# Patient Record
Sex: MALE | Race: WHITE | NOT HISPANIC OR LATINO | Employment: FULL TIME | URBAN - METROPOLITAN AREA
[De-identification: names, ages, dates, MRNs, and addresses within clinical notes are randomized per-mention and may not be internally consistent; named-entity substitution may affect disease eponyms.]

---

## 2023-03-04 ENCOUNTER — APPOINTMENT (OUTPATIENT)
Dept: RADIOLOGY | Facility: MEDICAL CENTER | Age: 68
DRG: 065 | End: 2023-03-04
Attending: EMERGENCY MEDICINE
Payer: MEDICARE

## 2023-03-04 ENCOUNTER — HOSPITAL ENCOUNTER (INPATIENT)
Facility: MEDICAL CENTER | Age: 68
LOS: 1 days | DRG: 065 | End: 2023-03-05
Attending: EMERGENCY MEDICINE | Admitting: INTERNAL MEDICINE
Payer: MEDICARE

## 2023-03-04 DIAGNOSIS — I63.9 STROKE WITH CEREBRAL ISCHEMIA (HCC): ICD-10-CM

## 2023-03-04 DIAGNOSIS — R53.1 ACUTE RIGHT-SIDED WEAKNESS: ICD-10-CM

## 2023-03-04 PROBLEM — I10 PRIMARY HYPERTENSION: Status: ACTIVE | Noted: 2023-03-04

## 2023-03-04 PROBLEM — C61 PROSTATE CANCER (HCC): Status: ACTIVE | Noted: 2023-03-04

## 2023-03-04 LAB
ABO + RH BLD: NORMAL
ABO GROUP BLD: NORMAL
ALBUMIN SERPL BCP-MCNC: 4.3 G/DL (ref 3.2–4.9)
ALBUMIN/GLOB SERPL: 1.5 G/DL
ALP SERPL-CCNC: 79 U/L (ref 30–99)
ALT SERPL-CCNC: 49 U/L (ref 2–50)
ANION GAP SERPL CALC-SCNC: 12 MMOL/L (ref 7–16)
APTT PPP: 24.6 SEC (ref 24.7–36)
AST SERPL-CCNC: 27 U/L (ref 12–45)
BASOPHILS # BLD AUTO: 0.7 % (ref 0–1.8)
BASOPHILS # BLD: 0.03 K/UL (ref 0–0.12)
BILIRUB SERPL-MCNC: 0.3 MG/DL (ref 0.1–1.5)
BLD GP AB SCN SERPL QL: NORMAL
BUN SERPL-MCNC: 18 MG/DL (ref 8–22)
CALCIUM ALBUM COR SERPL-MCNC: 9 MG/DL (ref 8.5–10.5)
CALCIUM SERPL-MCNC: 9.2 MG/DL (ref 8.5–10.5)
CHLORIDE SERPL-SCNC: 102 MMOL/L (ref 96–112)
CO2 SERPL-SCNC: 23 MMOL/L (ref 20–33)
CREAT SERPL-MCNC: 0.87 MG/DL (ref 0.5–1.4)
EKG IMPRESSION: NORMAL
EOSINOPHIL # BLD AUTO: 0.06 K/UL (ref 0–0.51)
EOSINOPHIL NFR BLD: 1.3 % (ref 0–6.9)
ERYTHROCYTE [DISTWIDTH] IN BLOOD BY AUTOMATED COUNT: 38.5 FL (ref 35.9–50)
EST. AVERAGE GLUCOSE BLD GHB EST-MCNC: 117 MG/DL
GFR SERPLBLD CREATININE-BSD FMLA CKD-EPI: 95 ML/MIN/1.73 M 2
GLOBULIN SER CALC-MCNC: 2.8 G/DL (ref 1.9–3.5)
GLUCOSE SERPL-MCNC: 115 MG/DL (ref 65–99)
HBA1C MFR BLD: 5.7 % (ref 4–5.6)
HCT VFR BLD AUTO: 46.1 % (ref 42–52)
HGB BLD-MCNC: 15.6 G/DL (ref 14–18)
IMM GRANULOCYTES # BLD AUTO: 0.04 K/UL (ref 0–0.11)
IMM GRANULOCYTES NFR BLD AUTO: 0.9 % (ref 0–0.9)
INR PPP: 0.92 (ref 0.87–1.13)
LYMPHOCYTES # BLD AUTO: 1.42 K/UL (ref 1–4.8)
LYMPHOCYTES NFR BLD: 31.3 % (ref 22–41)
MCH RBC QN AUTO: 28.8 PG (ref 27–33)
MCHC RBC AUTO-ENTMCNC: 33.8 G/DL (ref 33.7–35.3)
MCV RBC AUTO: 85.1 FL (ref 81.4–97.8)
MONOCYTES # BLD AUTO: 0.34 K/UL (ref 0–0.85)
MONOCYTES NFR BLD AUTO: 7.5 % (ref 0–13.4)
NEUTROPHILS # BLD AUTO: 2.65 K/UL (ref 1.82–7.42)
NEUTROPHILS NFR BLD: 58.3 % (ref 44–72)
NRBC # BLD AUTO: 0 K/UL
NRBC BLD-RTO: 0 /100 WBC
PLATELET # BLD AUTO: 201 K/UL (ref 164–446)
PMV BLD AUTO: 11.1 FL (ref 9–12.9)
POTASSIUM SERPL-SCNC: 4.3 MMOL/L (ref 3.6–5.5)
PROT SERPL-MCNC: 7.1 G/DL (ref 6–8.2)
PROTHROMBIN TIME: 12.3 SEC (ref 12–14.6)
RBC # BLD AUTO: 5.42 M/UL (ref 4.7–6.1)
RH BLD: NORMAL
SODIUM SERPL-SCNC: 137 MMOL/L (ref 135–145)
TROPONIN T SERPL-MCNC: 7 NG/L (ref 6–19)
WBC # BLD AUTO: 4.5 K/UL (ref 4.8–10.8)

## 2023-03-04 PROCEDURE — A9270 NON-COVERED ITEM OR SERVICE: HCPCS | Performed by: PSYCHIATRY & NEUROLOGY

## 2023-03-04 PROCEDURE — 92610 EVALUATE SWALLOWING FUNCTION: CPT

## 2023-03-04 PROCEDURE — 99222 1ST HOSP IP/OBS MODERATE 55: CPT | Performed by: PSYCHIATRY & NEUROLOGY

## 2023-03-04 PROCEDURE — 84484 ASSAY OF TROPONIN QUANT: CPT

## 2023-03-04 PROCEDURE — 770020 HCHG ROOM/CARE - TELE (206)

## 2023-03-04 PROCEDURE — 0042T CT-CEREBRAL PERFUSION ANALYSIS: CPT

## 2023-03-04 PROCEDURE — 36415 COLL VENOUS BLD VENIPUNCTURE: CPT

## 2023-03-04 PROCEDURE — 86901 BLOOD TYPING SEROLOGIC RH(D): CPT

## 2023-03-04 PROCEDURE — 85730 THROMBOPLASTIN TIME PARTIAL: CPT

## 2023-03-04 PROCEDURE — 86900 BLOOD TYPING SEROLOGIC ABO: CPT

## 2023-03-04 PROCEDURE — 83036 HEMOGLOBIN GLYCOSYLATED A1C: CPT

## 2023-03-04 PROCEDURE — 86850 RBC ANTIBODY SCREEN: CPT

## 2023-03-04 PROCEDURE — 99223 1ST HOSP IP/OBS HIGH 75: CPT | Mod: AI | Performed by: INTERNAL MEDICINE

## 2023-03-04 PROCEDURE — 700102 HCHG RX REV CODE 250 W/ 637 OVERRIDE(OP): Performed by: INTERNAL MEDICINE

## 2023-03-04 PROCEDURE — 71045 X-RAY EXAM CHEST 1 VIEW: CPT

## 2023-03-04 PROCEDURE — 85610 PROTHROMBIN TIME: CPT

## 2023-03-04 PROCEDURE — 99285 EMERGENCY DEPT VISIT HI MDM: CPT

## 2023-03-04 PROCEDURE — A9270 NON-COVERED ITEM OR SERVICE: HCPCS | Performed by: INTERNAL MEDICINE

## 2023-03-04 PROCEDURE — 85025 COMPLETE CBC W/AUTO DIFF WBC: CPT

## 2023-03-04 PROCEDURE — 70496 CT ANGIOGRAPHY HEAD: CPT

## 2023-03-04 PROCEDURE — 700117 HCHG RX CONTRAST REV CODE 255: Performed by: EMERGENCY MEDICINE

## 2023-03-04 PROCEDURE — 700102 HCHG RX REV CODE 250 W/ 637 OVERRIDE(OP): Performed by: PSYCHIATRY & NEUROLOGY

## 2023-03-04 PROCEDURE — 70450 CT HEAD/BRAIN W/O DYE: CPT

## 2023-03-04 PROCEDURE — 93005 ELECTROCARDIOGRAM TRACING: CPT | Performed by: EMERGENCY MEDICINE

## 2023-03-04 PROCEDURE — 700111 HCHG RX REV CODE 636 W/ 250 OVERRIDE (IP): Performed by: INTERNAL MEDICINE

## 2023-03-04 PROCEDURE — 94760 N-INVAS EAR/PLS OXIMETRY 1: CPT

## 2023-03-04 PROCEDURE — 70498 CT ANGIOGRAPHY NECK: CPT

## 2023-03-04 PROCEDURE — 80053 COMPREHEN METABOLIC PANEL: CPT

## 2023-03-04 RX ORDER — MONTELUKAST SODIUM 4 MG/1
1 TABLET, CHEWABLE ORAL 2 TIMES DAILY
COMMUNITY

## 2023-03-04 RX ORDER — ENOXAPARIN SODIUM 100 MG/ML
40 INJECTION SUBCUTANEOUS DAILY
Status: DISCONTINUED | OUTPATIENT
Start: 2023-03-04 | End: 2023-03-05 | Stop reason: HOSPADM

## 2023-03-04 RX ORDER — ASPIRIN 81 MG/1
162 TABLET, CHEWABLE ORAL DAILY
Status: DISCONTINUED | OUTPATIENT
Start: 2023-03-04 | End: 2023-03-04

## 2023-03-04 RX ORDER — BISACODYL 10 MG
10 SUPPOSITORY, RECTAL RECTAL
Status: DISCONTINUED | OUTPATIENT
Start: 2023-03-04 | End: 2023-03-05 | Stop reason: HOSPADM

## 2023-03-04 RX ORDER — OMEPRAZOLE 20 MG/1
20 CAPSULE, DELAYED RELEASE ORAL DAILY
COMMUNITY

## 2023-03-04 RX ORDER — ATORVASTATIN CALCIUM 80 MG/1
80 TABLET, FILM COATED ORAL EVERY EVENING
Status: DISCONTINUED | OUTPATIENT
Start: 2023-03-04 | End: 2023-03-05 | Stop reason: HOSPADM

## 2023-03-04 RX ORDER — POLYETHYLENE GLYCOL 3350 17 G/17G
1 POWDER, FOR SOLUTION ORAL
Status: DISCONTINUED | OUTPATIENT
Start: 2023-03-04 | End: 2023-03-05 | Stop reason: HOSPADM

## 2023-03-04 RX ORDER — ASPIRIN 81 MG/1
324 TABLET, CHEWABLE ORAL DAILY
Status: DISCONTINUED | OUTPATIENT
Start: 2023-03-04 | End: 2023-03-04

## 2023-03-04 RX ORDER — AMOXICILLIN 250 MG
2 CAPSULE ORAL 2 TIMES DAILY
Status: DISCONTINUED | OUTPATIENT
Start: 2023-03-04 | End: 2023-03-05 | Stop reason: HOSPADM

## 2023-03-04 RX ORDER — SILDENAFIL CITRATE 20 MG/1
20 TABLET ORAL DAILY
COMMUNITY

## 2023-03-04 RX ORDER — ASPIRIN 300 MG/1
300 SUPPOSITORY RECTAL DAILY
Status: DISCONTINUED | OUTPATIENT
Start: 2023-03-04 | End: 2023-03-04

## 2023-03-04 RX ORDER — ASPIRIN 325 MG
325 TABLET ORAL DAILY
Status: DISCONTINUED | OUTPATIENT
Start: 2023-03-04 | End: 2023-03-04

## 2023-03-04 RX ADMIN — ENOXAPARIN SODIUM 40 MG: 100 INJECTION SUBCUTANEOUS at 17:23

## 2023-03-04 RX ADMIN — IOHEXOL 40 ML: 350 INJECTION, SOLUTION INTRAVENOUS at 10:52

## 2023-03-04 RX ADMIN — ASPIRIN 81 MG 162 MG: 81 TABLET ORAL at 11:19

## 2023-03-04 RX ADMIN — IOHEXOL 80 ML: 350 INJECTION, SOLUTION INTRAVENOUS at 10:50

## 2023-03-04 RX ADMIN — ATORVASTATIN CALCIUM 80 MG: 80 TABLET, FILM COATED ORAL at 17:24

## 2023-03-04 ASSESSMENT — ENCOUNTER SYMPTOMS
CHILLS: 0
DIARRHEA: 0
ORTHOPNEA: 0
DIZZINESS: 0
FOCAL WEAKNESS: 1
WEIGHT LOSS: 0
ABDOMINAL PAIN: 0
VOMITING: 0
DOUBLE VISION: 0
SPEECH CHANGE: 0
CLAUDICATION: 0
PALPITATIONS: 0
FEVER: 0
WEAKNESS: 0
MYALGIAS: 0
CONSTIPATION: 0
TINGLING: 1
NAUSEA: 0
HEMOPTYSIS: 0
BLURRED VISION: 0
NECK PAIN: 0
PHOTOPHOBIA: 0
SENSORY CHANGE: 1
COUGH: 0

## 2023-03-04 ASSESSMENT — COGNITIVE AND FUNCTIONAL STATUS - GENERAL
DAILY ACTIVITIY SCORE: 24
MOVING FROM LYING ON BACK TO SITTING ON SIDE OF FLAT BED: A LITTLE
SUGGESTED CMS G CODE MODIFIER DAILY ACTIVITY: CH
WALKING IN HOSPITAL ROOM: A LITTLE
CLIMB 3 TO 5 STEPS WITH RAILING: A LITTLE
SUGGESTED CMS G CODE MODIFIER MOBILITY: CJ
MOBILITY SCORE: 21

## 2023-03-04 ASSESSMENT — PAIN DESCRIPTION - PAIN TYPE: TYPE: ACUTE PAIN

## 2023-03-04 NOTE — ED NOTES
68 y/o male notice weakness and numbness to right arm and leg, onset 0900. Pt NIH 2 decreased sensation to right and slight drift. Describes as not eeling the same. A&o x 4 no facial droop or slurred speech noted. Fsbs 118  Arrival time 1030

## 2023-03-04 NOTE — ED PROVIDER NOTES
"ED Provider Note    CHIEF COMPLAINT  Chief Complaint   Patient presents with    Numbness     Right sided onset 0900       EXTERNAL RECORDS REVIEWED  Other none available    HPI/ROS  LIMITATION TO HISTORY   Select: : None  OUTSIDE HISTORIAN(S):  Significant other pt's wife states pt    Ross Brown is a 67 y.o. male with history of hypertension who presents for strokelike symptoms.    Per EMS, the patient was reaching up to get an object out of a cabinet when he had acute onset of right arm weakness/numbness at 8:50 AM.    Patient's wife reports that the patient felt numbness on the right side of his tongue, the right side of his face and in his extremities.    Patient denies headache, chest pain, shortness of breath, history of similar symptoms.    PAST MEDICAL HISTORY   has a past medical history of Hypertension.  Prostate cancer    SURGICAL HISTORY  Prostatectomy  Cholecystectomy    FAMILY HISTORY  History reviewed. No pertinent family history.    SOCIAL HISTORY  Social History     Tobacco Use    Smoking status: Not on file    Smokeless tobacco: Not on file   Substance and Sexual Activity    Alcohol use: Not on file    Drug use: Not on file    Sexual activity: Not on file       CURRENT MEDICATIONS  Home Medications       Reviewed by Stacia Valencia (Pharmacy Tech) on 03/04/23 at 1230  Med List Status: Complete     Medication Last Dose Status   colestipol (COLESTID) 1 GM Tab 3/3/2023 Active   omeprazole (PRILOSEC) 20 MG delayed-release capsule 3/4/2023 Active   sildenafil (REVATIO) 20 MG tablet \"FEW DAYS AGO\" Active                    ALLERGIES  Allergies   Allergen Reactions    Penicillins Swelling     Swelling 25 years ago (1998)       PHYSICAL EXAM  VITAL SIGNS: /72   Pulse 74   Temp 37.2 °C (99 °F) (Temporal)   Resp 14   Ht 1.727 m (5' 8\")   Wt 79.4 kg (175 lb)   SpO2 93%   BMI 26.61 kg/m²    General:  WDWN male, nontoxic appearing in NAD; A+Ox3; V/S as above; elevated blood pressure  Skin: warm " and dry; good color; no rash  HEENT: NCAT; EOMs intact; PERRL; no scleral icterus   Neck: FROM, no JVD  Cardiovascular: Regular heart rate and rhythm.  No murmurs, rubs, or gallops; pulses 2+ bilaterally radially  Lungs: No respiratory distress or tachypnea; Clear to auscultation with good air movement bilaterally.  No wheezes, rhonchi, or rales.   Abdomen: BS present; soft; NTND; no rebound, guarding, or rigidity.  No organomegaly or pulsatile mass  Extremities: VERA x 4; no e/o trauma; no pedal edema; neg Kiran's  Neurologic: CNs III-XII formally intact; no facial droop, speech clear; distal sensation intact but reduced on the right;  strength 5 out of 5 bilaterally; slight drift on the right upper extremity  Psychiatric: Appropriate affect, normal mood      DIAGNOSTIC STUDIES / PROCEDURES  EKG  I have independently interpreted this EKG. no ST elevation.  No A-fib.    LABS  Results for orders placed or performed during the hospital encounter of 03/04/23   CBC WITH DIFFERENTIAL   Result Value Ref Range    WBC 4.5 (L) 4.8 - 10.8 K/uL    RBC 5.42 4.70 - 6.10 M/uL    Hemoglobin 15.6 14.0 - 18.0 g/dL    Hematocrit 46.1 42.0 - 52.0 %    MCV 85.1 81.4 - 97.8 fL    MCH 28.8 27.0 - 33.0 pg    MCHC 33.8 33.7 - 35.3 g/dL    RDW 38.5 35.9 - 50.0 fL    Platelet Count 201 164 - 446 K/uL    MPV 11.1 9.0 - 12.9 fL    Neutrophils-Polys 58.30 44.00 - 72.00 %    Lymphocytes 31.30 22.00 - 41.00 %    Monocytes 7.50 0.00 - 13.40 %    Eosinophils 1.30 0.00 - 6.90 %    Basophils 0.70 0.00 - 1.80 %    Immature Granulocytes 0.90 0.00 - 0.90 %    Nucleated RBC 0.00 /100 WBC    Neutrophils (Absolute) 2.65 1.82 - 7.42 K/uL    Lymphs (Absolute) 1.42 1.00 - 4.80 K/uL    Monos (Absolute) 0.34 0.00 - 0.85 K/uL    Eos (Absolute) 0.06 0.00 - 0.51 K/uL    Baso (Absolute) 0.03 0.00 - 0.12 K/uL    Immature Granulocytes (abs) 0.04 0.00 - 0.11 K/uL    NRBC (Absolute) 0.00 K/uL   PROTHROMBIN TIME   Result Value Ref Range    PT 12.3 12.0 - 14.6 sec     INR 0.92 0.87 - 1.13   APTT   Result Value Ref Range    APTT 24.6 (L) 24.7 - 36.0 sec   COD (ADULT)   Result Value Ref Range    ABO Grouping Only A     Rh Grouping Only NEG     Antibody Screen-Cod NEG    ABO Rh Confirm   Result Value Ref Range    ABO Rh Confirm A NEG    Comp Metabolic Panel   Result Value Ref Range    Sodium 137 135 - 145 mmol/L    Potassium 4.3 3.6 - 5.5 mmol/L    Chloride 102 96 - 112 mmol/L    Co2 23 20 - 33 mmol/L    Anion Gap 12.0 7.0 - 16.0    Glucose 115 (H) 65 - 99 mg/dL    Bun 18 8 - 22 mg/dL    Creatinine 0.87 0.50 - 1.40 mg/dL    Calcium 9.2 8.5 - 10.5 mg/dL    AST(SGOT) 27 12 - 45 U/L    ALT(SGPT) 49 2 - 50 U/L    Alkaline Phosphatase 79 30 - 99 U/L    Total Bilirubin 0.3 0.1 - 1.5 mg/dL    Albumin 4.3 3.2 - 4.9 g/dL    Total Protein 7.1 6.0 - 8.2 g/dL    Globulin 2.8 1.9 - 3.5 g/dL    A-G Ratio 1.5 g/dL   TROPONIN   Result Value Ref Range    Troponin T 7 6 - 19 ng/L   CORRECTED CALCIUM   Result Value Ref Range    Correct Calcium 9.0 8.5 - 10.5 mg/dL   ESTIMATED GFR   Result Value Ref Range    GFR (CKD-EPI) 95 >60 mL/min/1.73 m 2   EKG (NOW)   Result Value Ref Range    Report       [facility] Emergency Dept.    Test Date:  2023  Pt Name:    MARÍA ELENA BUTLER                   Department: ER  MRN:        [id number]                      Room:       RD 06  Gender:     Male                         Technician: [id number]  :        1955                   Requested By:FARHAN GONZALEZ  Order #:    [id number]                    Reading MD: FARHAN GONZALEZ MD    Measurements  Intervals                                Axis  Rate:       76                           P:          53  MA:         143                          QRS:        73  QRSD:       101                          T:          5  QT:         386  QTc:        435    Interpretive Statements  Sinus rhythm  Borderline low voltage, extremity leads  No previous ECG available for comparison  Electronically Signed On  3-4-2023 11:52:11 PST by FARHAN GONZALEZ MD           RADIOLOGY  Radiologist interpretation:   DX-CHEST-PORTABLE (1 VIEW)   Final Result         1. No acute cardiopulmonary abnormalities are identified.      CT-CEREBRAL PERFUSION ANALYSIS   Final Result      1.  Cerebral blood flow less than 30% likely representing completed infarct = 0 mL.      2.  T Max more than 6 seconds likely representing combination of completed infarct and ischemia = 0 mL.      3.  Mismatched volume likely representing ischemic brain/penumbra = None      4.  Please note that the cerebral perfusion was performed on the limited brain tissue around the basal ganglia region. Infarct/ischemia outside the CT perfusion sections can be missed in this study.      CT-CTA NECK WITH & W/O-POST PROCESSING   Final Result      1. No evidence of flow-limiting stenosis in the cervical carotid or cervical vertebral arteries.      CT-CTA HEAD WITH & W/O-POST PROCESS   Final Result         1. No hemodynamically significant narrowing of the major intracranial vessels.      CT-HEAD W/O   Final Result         1. No acute intracranial abnormality. No evidence of acute intracranial hemorrhage or mass lesion.                     EC-ECHOCARDIOGRAM COMPLETE W/O CONT    (Results Pending)   MR-BRAIN-W/O    (Results Pending)         COURSE & MEDICAL DECISION MAKING    ED Observation Status? No; Patient does not meet criteria for ED Observation.     INITIAL ASSESSMENT, COURSE AND PLAN  Care Narrative: 67-year-old presents for acute onset of right-sided weakness and numbness in his upper and lower extremities at 0850 this morning.    Patient was met at the charge desk as a stroke alert.  Dr. Gallegos arrived and performed the initial evaluation.    11:12 AM  Dr. Gallegos has reviewed the imaging and discussed the plan with the patient and his wife.  Given mild symptoms with an NIHSS of 2, no tPA will be given.  Patient will be hospitalized for further stroke work-up with MRI and risk  modification.  Patient received aspirin 162 mg p.o. here.    11:20 AM  Paging hospitalist for admission    11:50 AM  I discussed the case with the hospitalist who agrees to hospitalize the patient.    Patient was not a candidate for thrombolytic therapy (tPA) because  mild sxs, NIHSS 2    The total critical care time spent caring for this patient totaled 35 minutes due to the high probability of imminent deterioration/CVA.  This time included speaking to the patient's wife, reassessment, speaking with neurology and speaking with the admitting physician.      DISPOSITION AND DISCUSSIONS  I have discussed management of the patient with the following physicians and HERNAN's:    Ke/neurology  Algharbi    Decision tools and prescription drugs considered including, but not limited to: NIH Stroke Scale 2 .    FINAL DIAGNOSIS  1. Acute right-sided weakness    2. Stroke with cerebral ischemia (HCC)      Electronically signed by: Cristiane Luna M.D., 3/4/2023 11:10 AM

## 2023-03-04 NOTE — THERAPY
Speech Language Pathology   Clinical Swallow Evaluation     Patient Name: Ross Brown  AGE:  67 y.o., SEX:  male  Medical Record #: [id number]  Today's Date: 3/4/2023     Precautions  Precautions: Fall Risk, Swallow Precautions    Assessment    HPI: 67 year old man with hyperlipidemia presenting with R hemibody numbness and subtle R arm weakness.  Stroke protocol CT without evidence of hemorrhage, large territory stroke, critical stenoses, or large vessel occlusion.  CT perfusion without focal delay.     PMHx HTN, Prostate cx    CMHx Stroke w/cerebral ischemia        Level of Consciousness: Alert, Awake  Affect/Behavior: Appropriate, Calm, Cooperative  Follows Directives: Yes  Orientation: Oriented x 4  Hearing: Functional hearing  Vision: Functional vision      Prior Living Situation & Level of Function:  Patient is I with ADLs at baseline, works full-time and denies hx of dysphagia. Pt does report hx of acid reflux.      Oral Mechanism Evaluation  Facial Symmetry: Equal  Facial Sensation: Equal  Labial Observations: WFL  Lingual Observations: Midline  Dentition: Good  Comments: Pt reported mild R-sided weakness      Voice  Quality: WFL  Resonance: WFL  Intensity: Appropriate  Cough: WFL  Comments:      Current Method of Nutrition   NPO until cleared by speech pathology         Assessment  Positioning: Rogers's (60-90 degrees)  Bolus Administration: Patient  Oxygen Requirements: Room Air  Factor(s) Affecting Performance: None    Swallowing Trials  Thin Liquid (TN0): WFL  Liquidised (LQ3): WFL  Soft & Bite-Sized (SB6): WFL  Regular (RG7): WFL    Comments:  Intact bolus acceptance, containment, mastication formation and lingual transit without any significant oral residue noted post swallow. Pharyngeal swallow response was timely. No s/sx of aspiration occurred w/ PO intake of thins via tsp/cup/serial cup sips, applesauce, string cheese bites and a cracker.          Clinical Impressions  Patients presents with no  "clinical indicators of dysphagia during this evaluation, apart from dementia as a risk factor. Diet modification is not indicated. SLP will follow 1-2x to ensure diet tolerance.        Recommendations  1.  Regular solids and thin liquids  2.  Instrumentation: None indicated at this time, Instrumental swallow study pending clinical progress  3.  Swallowing Instructions & Precautions:   Supervision: Independent  Positioning: Fully upright and midline during oral intake, Remain upright for 30 minutes after oral intake  Medication: Whole with liquid, Whole with puree, As tolerated  Strategies: Small bites/sips, Slow rate of intake  Oral Care: BID      Plan    Recommend Speech Therapy 3 times per week until therapy goals are met for the following treatments:  Dysphagia Training and Patient / Family / Caregiver Education.    Discharge Recommendations: Anticipate that the patient will have no further speech therapy needs after discharge from the hospital         Objective       03/04/23 1429   Patient / Family Goals   Patient / Family Goal #1 \"no problem\" - w/PO   Short Term Goals   Short Term Goal # 1 Patient will consume a diet of regular solids and thin liquids with no overt s/sx of aspiration with adherence to reflex precautions       "

## 2023-03-04 NOTE — H&P
Hospital Medicine History & Physical Note    Date of Service  3/4/2023    Primary Care Physician  No primary care provider on file.    Consultants  neuro    Code Status  Full Code    Chief Complaint  Chief Complaint   Patient presents with    Numbness     Right sided onset 0900       History of Presenting Illness    67-year-old male with history of prostate cancer and hypertension presented 3/4 with numbness on the right side.  Started around 9 AM today with numbness on the right side with mild weakness also numbness on his face.  Denied any loss of consciousness no palpitation or chest pain.  And no shortness of breath.  Denied any family history of stroke or diabetes and no history of coronary artery disease, no history of smoking.  Patient is visiting the town.  Labs around baseline and vital signs were stable.  CT head did not show any acute finding or bleeding and CTA for head and neck did not show any stenosis.  EKG was sinus rhythm.  Patient was evaluated by neurologist and no need for tPA, patient will be admitted to the hospital for stroke work-up.    I discussed the plan of care with patient, family, and bedside RN.    Review of Systems  Review of Systems   Constitutional:  Negative for chills, fever and weight loss.   HENT:  Negative for ear pain, hearing loss and tinnitus.    Eyes:  Negative for blurred vision, double vision and photophobia.   Respiratory:  Negative for cough and hemoptysis.    Cardiovascular:  Negative for chest pain, palpitations, orthopnea and claudication.   Gastrointestinal:  Negative for abdominal pain, constipation, diarrhea, nausea and vomiting.   Genitourinary:  Negative for dysuria, frequency and urgency.   Musculoskeletal:  Negative for myalgias and neck pain.   Skin:  Negative for rash.   Neurological:  Positive for tingling, sensory change and focal weakness. Negative for dizziness, speech change and weakness.     Past Medical History   has a past medical history of  Hypertension.    Surgical History  Prostate surgery and cholecystectomy    Family History  Patient denied any family history of heart disease or diabetes  Family history reviewed with patient. There is no family history that is pertinent to the chief complaint.     Social History       Allergies  Allergies   Allergen Reactions    Yellow Jacket Venom Anaphylaxis    Penicillins Swelling     Swelling 25 years ago (1998)       Medications  None       Physical Exam  Temp:  [37.2 °C (99 °F)] 37.2 °C (99 °F)  Pulse:  [74-82] 74  Resp:  [14-18] 14  BP: (129-147)/(70-76) 133/72  SpO2:  [93 %-96 %] 93 %  Blood Pressure : 133/72   Temperature: 37.2 °C (99 °F)   Pulse: 74   Respiration: 14   Pulse Oximetry: 93 %       Physical Exam  Constitutional:       Appearance: He is not ill-appearing.   Eyes:      General: No scleral icterus.  Cardiovascular:      Rate and Rhythm: Normal rate.      Heart sounds: No murmur heard.  Pulmonary:      Effort: No respiratory distress.      Breath sounds: No wheezing.   Abdominal:      General: There is no distension.      Tenderness: There is no abdominal tenderness. There is no right CVA tenderness, left CVA tenderness or guarding.   Musculoskeletal:      Right lower leg: No edema.      Left lower leg: No edema.   Lymphadenopathy:      Cervical: No cervical adenopathy.   Skin:     Coloration: Skin is not jaundiced.      Findings: No bruising, lesion or rash.   Neurological:      General: No focal deficit present.      Mental Status: He is alert and oriented to person, place, and time. Mental status is at baseline.      Cranial Nerves: No cranial nerve deficit.      Sensory: Sensory deficit present.      Motor: Weakness present.      Gait: Gait normal.      Deep Tendon Reflexes: Babinski sign absent on the right side. Babinski sign absent on the left side.      Comments: Mild weakness on the right side  Numbness on the right side       Laboratory:  Recent Labs     03/04/23  1035   WBC 4.5*   RBC  5.42   HEMOGLOBIN 15.6   HEMATOCRIT 46.1   MCV 85.1   MCH 28.8   MCHC 33.8   RDW 38.5   PLATELETCT 201   MPV 11.1     Recent Labs     03/04/23  1124   SODIUM 137   POTASSIUM 4.3   CHLORIDE 102   CO2 23   GLUCOSE 115*   BUN 18   CREATININE 0.87   CALCIUM 9.2     Recent Labs     03/04/23  1124   ALTSGPT 49   ASTSGOT 27   ALKPHOSPHAT 79   TBILIRUBIN 0.3   GLUCOSE 115*     Recent Labs     03/04/23  1035   APTT 24.6*   INR 0.92     No results for input(s): NTPROBNP in the last 72 hours.      Recent Labs     03/04/23  1124   TROPONINT 7       Imaging:  DX-CHEST-PORTABLE (1 VIEW)   Final Result         1. No acute cardiopulmonary abnormalities are identified.      CT-CEREBRAL PERFUSION ANALYSIS   Final Result      1.  Cerebral blood flow less than 30% likely representing completed infarct = 0 mL.      2.  T Max more than 6 seconds likely representing combination of completed infarct and ischemia = 0 mL.      3.  Mismatched volume likely representing ischemic brain/penumbra = None      4.  Please note that the cerebral perfusion was performed on the limited brain tissue around the basal ganglia region. Infarct/ischemia outside the CT perfusion sections can be missed in this study.      CT-CTA NECK WITH & W/O-POST PROCESSING   Final Result      1. No evidence of flow-limiting stenosis in the cervical carotid or cervical vertebral arteries.      CT-CTA HEAD WITH & W/O-POST PROCESS   Final Result         1. No hemodynamically significant narrowing of the major intracranial vessels.      CT-HEAD W/O   Final Result         1. No acute intracranial abnormality. No evidence of acute intracranial hemorrhage or mass lesion.                     EC-ECHOCARDIOGRAM COMPLETE W/O CONT    (Results Pending)   MR-BRAIN-W/O    (Results Pending)       X-Ray:  I have personally reviewed the images and compared with prior images.  EKG:  I have personally reviewed the images and compared with prior images.    Assessment/Plan:  Justification for  Admission Status  I anticipate this patient will require at least two midnights for appropriate medical management, necessitating inpatient admission because stroke    Patient will need a Telemetry bed on NEUROLOGY service .  The need is secondary to stroke.    * Stroke with cerebral ischemia (HCC)- (present on admission)  Assessment & Plan  Numbness with mild weakness on the right side  Some improving  CT head did not show any bleeding  CTA for head and neck did not show any stenosis  EKG sinus rhythm  Admit to the neuro floor, neuro check every 4 hours,  MRI  Telemetry  Check A1c and lipid panel  Echo  PT and OT and speech evaluation  Aspirin with atorvastatin    Prostate cancer (HCC)  Assessment & Plan  Has surgery more than 5 years  Remission  Follow-up with urologist at outpatient    Primary hypertension  Assessment & Plan  Stable on admission  Target 130/80  Start with lisinopril if needed        VTE prophylaxis: SCDs/TEDs and enoxaparin ppx

## 2023-03-04 NOTE — ASSESSMENT & PLAN NOTE
Numbness with mild weakness on the right side  Some improving  CT head did not show any bleeding  CTA for head and neck did not show any stenosis  EKG sinus rhythm  Admit to the neuro floor, neuro check every 4 hours,  MRI  Telemetry  Check A1c and lipid panel  Echo  PT and OT and speech evaluation  Aspirin with atorvastatin

## 2023-03-04 NOTE — ED NOTES
Med Rec complete per patient  No oral antibiotics in the last 30 days   Allergies reviewed  Preferred Pharmacy: [facility] or [facility]   Patient states he takes the Sildenafil about 5 days out of the week as an Alzheimer's prevention

## 2023-03-04 NOTE — CONSULTS
"Neurology STROKE CODE H&P  Neurohospitalist Service, [facility]    Referring Physician: Cristiane Luna    STROKE CODE: R side weakness and numbness    To obtain the most accurate data regarding the time called, and time patient seen, refer to the stroke run-sheet and chart.  For time of CT, refer to the radiology report. See A&P below for TPA Decision and door to needle time if and when applicable.    HPI: Ross Brown is a 67 year old man with no known vascular risk factors, visiting from [place], [place], presenting with acute onset R face, arm and leg numbness, and mild weakness of the R arm.  Ross reports symptoms acutely started at 0900 this morning.  Symptoms persisted, and EMS was called.  On their arrival, SBPs in 170s, FSBS 118.  On arrival to [facility], NIHSS 2 for R hemibody and face numbness (about a 50% reduction) and slight drift in the RUE.   On ROS, patient reports no infectious prodrome, no constitutional symptoms.  Denies use of any blood thinners.  Denies headache.    Review of systems: In addition to what is detailed in the HPI above, all other systems reviewed and are negative.    Past Medical/Surgical History:   Cholecystectomy, prostatectomy    FHx:  No family history of early strokes or blood clotting disorder    SHx:  Denies smoking cigarettes, denies recreational drug use, occasional EtOH consumption in social setting    Allergies:  Allergies   Allergen Reactions    Penicillins Swelling     Swelling 25 years ago (1998)       Medications:  No current facility-administered medications for this encounter.  No current outpatient medications on file.    Physical Examination:    Vitals:    03/04/23 1000   Weight: 79.4 kg (175 lb)   Height: 1.727 m (5' 8\")       General: Patient is awake and in no acute distress  Eye: Examination of optic disks not indicated at this time given acuity of consult  Neck: There is normal range of motion  CV: Regular rate   Extremities:  Clear, dry, " intact, without peripheral edema    NEUROLOGICAL EXAM:     Mental status: Awake, alert and fully oriented  Speech and language: Speech is clear and fluent. The patient is able to name and repeat, and follow commands  Cranial nerve exam: Visual fields are full. There is no nystagmus. Extraocular muscles are intact. Face is symmetric.   Motor exam: There is sustained antigravity with no downward drift in L arm and bilateral legs.  There is slight downward drift in the RUE  Sensory exam:  Reacts to tactile in all 4 distal extremities, there is no neglect to double stim.  Subjective decrease of about 50% in R hemibody  Coordination: No ataxia on bilateral finger-to-nose testing.  Gait: Deferred due to patient preference.    NIHSS: National Institutes of Health Stroke Scale    [0] 1a:Level of Consciousness    0-alert 1-drowsy   2-stupor   3-coma  [0] 1b:LOC Questions                  0-both  1-one      2-neither  [0] 1c:LOC Commands                   0-both  1-one      2-neither  [0] 2: Best Gaze                     0-nl    1-partial  2-forced  [0] 3: Visual Fields                   0-nl    1-partial  2-complete 3-bilat  [0] 4: Facial Paresis                0-nl    1-minor    2-partial  3-full  MOTOR                       0-nl  [1] 5: Right Arm           1-drift  [0] 6: Left Arm             2-some effort vs gravity  [0] 7: Right Leg           3-no effort vs gravity  [0] 8: Left Leg             4-no movement                             x-untestable  [0] 9: Limb Ataxia                    0-abs   1-1_limb   2-2+_limbs       x-untestable  [1] 10:Sensory                        0-nl    1-partial  2-dense  [0] 11:Best Language/Aphasia         0-nl    1-mild/mod 2-severe   3-mute  [0] 12:Dysarthria                     0-nl    1-mild/mod 2-severe       x-untestable  [0] 13:Neglect/Inattention            0-none  1-partial  2-complete  [1] TOTAL    Baseline Modified Patricia Scale (MRS): 0 = No symptoms    Objective  Data:    Labs:  No results found for: PROTHROMBTM, INR   No results found for: WBC, RBC, HEMOGLOBIN, HEMATOCRIT, MCV, MCH, MCHC, MPV, NEUTSPOLYS, LYMPHOCYTES, MONOCYTES, EOSINOPHILS, BASOPHILS, HYPOCHROMIA, ANISOCYTOSIS   No results found for: SODIUM, POTASSIUM, CHLORIDE, CO2, GLUCOSE, BUN, CREATININE, BUNCREATRAT, GLOMRATE   No results found for: CHOLSTRLTOT, LDL, HDL, TRIGLYCERIDE    No results found for: ALKPHOSPHAT, ASTSGOT, ALTSGPT, TBILIRUBIN     Imaging/Testing:    I interpreted and/or reviewed the patient's neuroimaging    DX-CHEST-PORTABLE (1 VIEW)   Final Result         1. No acute cardiopulmonary abnormalities are identified.      CT-CEREBRAL PERFUSION ANALYSIS   Final Result      1.  Cerebral blood flow less than 30% likely representing completed infarct = 0 mL.      2.  T Max more than 6 seconds likely representing combination of completed infarct and ischemia = 0 mL.      3.  Mismatched volume likely representing ischemic brain/penumbra = None      4.  Please note that the cerebral perfusion was performed on the limited brain tissue around the basal ganglia region. Infarct/ischemia outside the CT perfusion sections can be missed in this study.      CT-CTA NECK WITH & W/O-POST PROCESSING   Final Result      1. No evidence of flow-limiting stenosis in the cervical carotid or cervical vertebral arteries.      CT-CTA HEAD WITH & W/O-POST PROCESS   Final Result         1. No hemodynamically significant narrowing of the major intracranial vessels.      CT-HEAD W/O   Final Result         1. No acute intracranial abnormality. No evidence of acute intracranial hemorrhage or mass lesion.                         Assessment and Plan:  Ross Brown is a 67 year old man with hyperlipidemia presenting with R hemibody numbness and subtle R arm weakness.  Stroke protocol CT without evidence of hemorrhage, large territory stroke, critical stenoses, or large vessel occlusion.  CT perfusion without focal delay.  Use of  thrombolytic discussed with patient, consensus given very mild, non-debilitating symptoms was to defer treatment.  Will admit for stroke evaluation and initiate secondary stroke prevention regimen as noted below.    Problem list:   R hemibody numbness, including face    Recommendations:   - admit to observation, q4h neurochecks/NIHSS   - give ASA 162mg now (ordered), continue ASA 81mg daily   - long-term BP goal is 110-130/60-80; in the absence of an identified ischemic penumbra, no need for permissive hypertension   - MRI brain without contrast   - stroke labs:  HgbA1c and lipid panel   - start atorvastatin 80mg daily for goal LDL < 70; titrate dose to LDL results   - PT/OT/SLP   - lovenox SQ for DVT chemoppx ok while admitted   - TTE and long-term cardiac monitoring (ziopatch can be completed on return home to [facility])     Patient discussed with Dr. Luna, ER attending.    Gonzales Gallegos MD  Neurohospitalist, [facility]

## 2023-03-05 ENCOUNTER — APPOINTMENT (OUTPATIENT)
Dept: RADIOLOGY | Facility: MEDICAL CENTER | Age: 68
DRG: 065 | End: 2023-03-05
Attending: STUDENT IN AN ORGANIZED HEALTH CARE EDUCATION/TRAINING PROGRAM
Payer: MEDICARE

## 2023-03-05 ENCOUNTER — PHARMACY VISIT (OUTPATIENT)
Dept: PHARMACY | Facility: MEDICAL CENTER | Age: 68
End: 2023-03-05
Payer: COMMERCIAL

## 2023-03-05 ENCOUNTER — APPOINTMENT (OUTPATIENT)
Dept: CARDIOLOGY | Facility: MEDICAL CENTER | Age: 68
DRG: 065 | End: 2023-03-05
Attending: STUDENT IN AN ORGANIZED HEALTH CARE EDUCATION/TRAINING PROGRAM
Payer: MEDICARE

## 2023-03-05 VITALS
DIASTOLIC BLOOD PRESSURE: 74 MMHG | HEART RATE: 73 BPM | TEMPERATURE: 97.9 F | WEIGHT: 175 LBS | RESPIRATION RATE: 17 BRPM | OXYGEN SATURATION: 94 % | SYSTOLIC BLOOD PRESSURE: 135 MMHG | BODY MASS INDEX: 26.52 KG/M2 | HEIGHT: 68 IN

## 2023-03-05 LAB
ANION GAP SERPL CALC-SCNC: 10 MMOL/L (ref 7–16)
BASOPHILS # BLD AUTO: 0.5 % (ref 0–1.8)
BASOPHILS # BLD: 0.03 K/UL (ref 0–0.12)
BUN SERPL-MCNC: 15 MG/DL (ref 8–22)
CALCIUM SERPL-MCNC: 8.9 MG/DL (ref 8.5–10.5)
CHLORIDE SERPL-SCNC: 105 MMOL/L (ref 96–112)
CHOLEST SERPL-MCNC: 197 MG/DL (ref 100–199)
CO2 SERPL-SCNC: 25 MMOL/L (ref 20–33)
CREAT SERPL-MCNC: 0.92 MG/DL (ref 0.5–1.4)
EOSINOPHIL # BLD AUTO: 0.09 K/UL (ref 0–0.51)
EOSINOPHIL NFR BLD: 1.4 % (ref 0–6.9)
ERYTHROCYTE [DISTWIDTH] IN BLOOD BY AUTOMATED COUNT: 39.2 FL (ref 35.9–50)
GFR SERPLBLD CREATININE-BSD FMLA CKD-EPI: 92 ML/MIN/1.73 M 2
GLUCOSE SERPL-MCNC: 109 MG/DL (ref 65–99)
HCT VFR BLD AUTO: 44.6 % (ref 42–52)
HDLC SERPL-MCNC: 52 MG/DL
HGB BLD-MCNC: 15.1 G/DL (ref 14–18)
IMM GRANULOCYTES # BLD AUTO: 0.02 K/UL (ref 0–0.11)
IMM GRANULOCYTES NFR BLD AUTO: 0.3 % (ref 0–0.9)
LDLC SERPL CALC-MCNC: 130 MG/DL
LYMPHOCYTES # BLD AUTO: 1.9 K/UL (ref 1–4.8)
LYMPHOCYTES NFR BLD: 30.1 % (ref 22–41)
MAGNESIUM SERPL-MCNC: 2 MG/DL (ref 1.5–2.5)
MCH RBC QN AUTO: 28.4 PG (ref 27–33)
MCHC RBC AUTO-ENTMCNC: 33.9 G/DL (ref 33.7–35.3)
MCV RBC AUTO: 84 FL (ref 81.4–97.8)
MONOCYTES # BLD AUTO: 0.54 K/UL (ref 0–0.85)
MONOCYTES NFR BLD AUTO: 8.6 % (ref 0–13.4)
NEUTROPHILS # BLD AUTO: 3.73 K/UL (ref 1.82–7.42)
NEUTROPHILS NFR BLD: 59.1 % (ref 44–72)
NRBC # BLD AUTO: 0 K/UL
NRBC BLD-RTO: 0 /100 WBC
PLATELET # BLD AUTO: 181 K/UL (ref 164–446)
PMV BLD AUTO: 10.9 FL (ref 9–12.9)
POTASSIUM SERPL-SCNC: 4.1 MMOL/L (ref 3.6–5.5)
RBC # BLD AUTO: 5.31 M/UL (ref 4.7–6.1)
SODIUM SERPL-SCNC: 140 MMOL/L (ref 135–145)
TRIGL SERPL-MCNC: 74 MG/DL (ref 0–149)
WBC # BLD AUTO: 6.3 K/UL (ref 4.8–10.8)

## 2023-03-05 PROCEDURE — 700102 HCHG RX REV CODE 250 W/ 637 OVERRIDE(OP): Performed by: STUDENT IN AN ORGANIZED HEALTH CARE EDUCATION/TRAINING PROGRAM

## 2023-03-05 PROCEDURE — 83735 ASSAY OF MAGNESIUM: CPT

## 2023-03-05 PROCEDURE — RXMED WILLOW AMBULATORY MEDICATION CHARGE: Performed by: STUDENT IN AN ORGANIZED HEALTH CARE EDUCATION/TRAINING PROGRAM

## 2023-03-05 PROCEDURE — 97161 PT EVAL LOW COMPLEX 20 MIN: CPT

## 2023-03-05 PROCEDURE — 70551 MRI BRAIN STEM W/O DYE: CPT

## 2023-03-05 PROCEDURE — 700102 HCHG RX REV CODE 250 W/ 637 OVERRIDE(OP): Performed by: INTERNAL MEDICINE

## 2023-03-05 PROCEDURE — 80048 BASIC METABOLIC PNL TOTAL CA: CPT

## 2023-03-05 PROCEDURE — A9270 NON-COVERED ITEM OR SERVICE: HCPCS | Performed by: STUDENT IN AN ORGANIZED HEALTH CARE EDUCATION/TRAINING PROGRAM

## 2023-03-05 PROCEDURE — 80061 LIPID PANEL: CPT

## 2023-03-05 PROCEDURE — 85025 COMPLETE CBC W/AUTO DIFF WBC: CPT

## 2023-03-05 PROCEDURE — 36415 COLL VENOUS BLD VENIPUNCTURE: CPT

## 2023-03-05 PROCEDURE — 97165 OT EVAL LOW COMPLEX 30 MIN: CPT

## 2023-03-05 PROCEDURE — A9270 NON-COVERED ITEM OR SERVICE: HCPCS | Performed by: INTERNAL MEDICINE

## 2023-03-05 PROCEDURE — 99239 HOSP IP/OBS DSCHRG MGMT >30: CPT | Performed by: STUDENT IN AN ORGANIZED HEALTH CARE EDUCATION/TRAINING PROGRAM

## 2023-03-05 PROCEDURE — 99233 SBSQ HOSP IP/OBS HIGH 50: CPT | Performed by: PSYCHIATRY & NEUROLOGY

## 2023-03-05 RX ORDER — ATORVASTATIN CALCIUM 80 MG/1
80 TABLET, FILM COATED ORAL EVERY EVENING
Qty: 30 TABLET | Refills: 0 | Status: SHIPPED | OUTPATIENT
Start: 2023-03-05

## 2023-03-05 RX ORDER — OMEPRAZOLE 20 MG/1
20 CAPSULE, DELAYED RELEASE ORAL DAILY
Status: DISCONTINUED | OUTPATIENT
Start: 2023-03-05 | End: 2023-03-05 | Stop reason: HOSPADM

## 2023-03-05 RX ORDER — ASPIRIN 81 MG/1
81 TABLET ORAL DAILY
Qty: 30 TABLET | Refills: 0 | Status: SHIPPED | OUTPATIENT
Start: 2023-03-06

## 2023-03-05 RX ADMIN — OMEPRAZOLE 20 MG: 20 CAPSULE, DELAYED RELEASE ORAL at 10:28

## 2023-03-05 RX ADMIN — ASPIRIN 81 MG: 81 TABLET, COATED ORAL at 05:04

## 2023-03-05 RX ADMIN — SENNOSIDES AND DOCUSATE SODIUM 2 TABLET: 50; 8.6 TABLET ORAL at 05:04

## 2023-03-05 ASSESSMENT — GAIT ASSESSMENTS
DISTANCE (FEET): 350
GAIT LEVEL OF ASSIST: SUPERVISED

## 2023-03-05 ASSESSMENT — COGNITIVE AND FUNCTIONAL STATUS - GENERAL
DAILY ACTIVITIY SCORE: 24
SUGGESTED CMS G CODE MODIFIER DAILY ACTIVITY: CH
MOBILITY SCORE: 24
SUGGESTED CMS G CODE MODIFIER MOBILITY: CH

## 2023-03-05 ASSESSMENT — ACTIVITIES OF DAILY LIVING (ADL): TOILETING: INDEPENDENT

## 2023-03-05 ASSESSMENT — PAIN DESCRIPTION - PAIN TYPE: TYPE: ACUTE PAIN

## 2023-03-05 NOTE — PROGRESS NOTES
Monitor summary: SR 79-91, CO 0.11, QRS 0.07, QT 0.35, with rare PVCs per strip from monitor room.

## 2023-03-05 NOTE — THERAPY
Occupational Therapy   Initial Evaluation     Patient Name: Ross Brown  Age:  67 y.o., Sex:  male  Medical Record #: [id number]  Today's Date: 3/5/2023     Precautions  Precautions: Fall Risk, Swallow Precautions    Assessment  Patient is 67 y.o. male with a diagnosis of numbness R side.   Pt is at or near his/her functional baseline. Pt with no further skilled OT needs in the acute care setting at this time.       Plan    Occupational Therapy Initial Treatment Plan   Duration: (P) Discharge Needs Only       Discharge Recommendations: (P) Anticipate that the patient will have no further occupational therapy needs after discharge from the hospital        03/05/23 0853   Prior Living Situation   Prior Services None   Housing / Facility 1 Story House   Steps Into Home 1   Equipment Owned None   Lives with - Patient's Self Care Capacity Spouse;Child Less than 18 Years of Age   Prior Level of ADL Function   Self Feeding Independent   Grooming / Hygiene Independent   Bathing Independent   Dressing Independent   Toileting Independent   ADL Assessment   Grooming Supervision   Upper Body Dressing Supervision   Lower Body Dressing Supervision   Toileting Supervision   Functional Mobility   Sit to Stand Supervised   Bed, Chair, Wheelchair Transfer Supervised   Occupational Therapy Initial Treatment Plan    Duration Discharge Needs Only   Anticipated Discharge Equipment and Recommendations   Discharge Recommendations Anticipate that the patient will have no further occupational therapy needs after discharge from the hospital

## 2023-03-05 NOTE — PROGRESS NOTES
4 Eyes Skin Assessment Completed by MACARIO Moura and MACARIO Francisco.    Head WDL  Ears WDL  Nose WDL  Mouth WDL  Neck WDL  Breast/Chest Redness and Blanching  Shoulder Blades WDL  Spine Redness and Blanching  (R) Arm/Elbow/Hand Redness and Blanching  (L) Arm/Elbow/Hand Redness and Blanching  Abdomen WDL  Groin WDL  Scrotum/Coccyx/Buttocks Redness and Blanching  (R) Leg WDL  (L) Leg WDL  (R) Heel/Foot/Toe WDL  (L) Heel/Foot/Toe WDL          Devices In Places Tele Box, Pulse Ox, and SCD's      Interventions In Place Pillows, Heels Loaded W/Pillows, and Pressure Redistribution Mattress    Possible Skin Injury No    Pictures Uploaded Into Epic N/A  Wound Consult Placed N/A  RN Wound Prevention Protocol Ordered No

## 2023-03-05 NOTE — PROGRESS NOTES
Monitor summary: SR 59-79, ID -[id number], QRS -0.08, QT -0.42, with rare PACs and PVCs per strip from the monitor room.

## 2023-03-05 NOTE — PROGRESS NOTES
Pt discharge instructions and education provided. IV's and lines removed. Pt has all of his belongings including [id number]. Pt escorted down to  parking via wheelchair by nursing staff.

## 2023-03-05 NOTE — THERAPY
Physical Therapy   Initial Evaluation     Patient Name: Ross Brown  Age:  67 y.o., Sex:  male  Medical Record #: [id number]  Today's Date: 3/5/2023    Assessment  Patient is a 67 y.o. male admitted with concern for stroke with c/o R face and extremity numbness. MRI pending at time of eval. Pt completed mobility without assist, ambulated without AD, no LOB. Spouse present and supportive, reports pt will have assist at home if needed and pt appears functionally capable of DC at this time. Encouraged ambulating with nursing, no further acute PT needs.     Plan  Evaluation only  DC Equipment Recommendations: None  Discharge Recommendations: Anticipate that the patient will have no further physical therapy needs after discharge from the hospital     03/05/23 0916   Prior Living Situation   Prior Services None   Housing / Facility 1 Story House   Steps Into Home 1   Steps In Home 0   Bathroom Set up Walk In Shower   Equipment Owned None   Lives with -  Spouse;Child Less than 18 Years of Age   Comments 18yo twins, spouse present and supportive, able to assist as needed   Prior Level of Functional Mobility   Bed Mobility Independent   Transfer Status Independent   Ambulation Independent   Ambulation Distance community distances   Assistive Devices Used None   Stairs Independent   Cognition    Level of Consciousness Alert   Comments pleasant and motivated   Strength Lower Body   Comments grossly 5/5   Sensation Lower Body   Comments diminished sensation to RLE, still able to identify light touch vs pressure   Coordination Lower Body    Comments very mild coordination deficit RLE with toe tapping and heel to shin   Vision   Vision Comments denies changes   Balance Assessment   Sitting Balance (Static) Good   Sitting Balance (Dynamic) Good   Standing Balance (Static) Fair +   Standing Balance (Dynamic) Fair   Weight Shift Sitting Good   Weight Shift Standing Fair   Comments no AD   Bed Mobility    Supine to Sit Supervised  Pt is complaining of chest pain.  Will give Maalox, start PPI  Check EKG, trop X 3  Check 2D echo  Will monitor the need of telemetry.   Scooting Supervised   Gait Analysis   Gait Level Of Assist Supervised   Assistive Device None   Distance (Feet) 350   Weight Bearing Status no restrictions   Comments no LOB, able to turn head all directions while ambulating   Functional Mobility   Sit to Stand Supervised   Bed, Chair Transfer Supervised

## 2023-03-05 NOTE — CARE PLAN
The patient is Stable - Low risk of patient condition declining or worsening    Shift Goals  Clinical Goals: Safety/mobility    Progress made toward(s) clinical / shift goals:    Problem: Self Care  Goal: Patient will have the ability to perform ADLs independently or with assistance (bathe, groom, dress, toilet and feed)  Outcome: Progressing  Encourage pt to brush teeth during shift.     Problem: Risk for Aspiration  Goal: Patient's risk for aspiration will be absent or decrease  Outcome: Progressing     Pt up 90 degrees when eating meals.     Patient is not progressing towards the following goals: N/A

## 2023-03-05 NOTE — DISCHARGE INSTRUCTIONS
Discharge Instructions    Discharged to home by car with relative. Discharged via wheelchair, hospital escort: Yes.  Special equipment needed: Not Applicable    Be sure to schedule a follow-up appointment with your primary care doctor or any specialists as instructed.     Discharge Plan:   Diet Plan: Discussed  Activity Level: Discussed  Confirmed Follow up Appointment: Patient to Call and Schedule Appointment  Confirmed Symptoms Management: Discussed  Medication Reconciliation Updated: Yes  Influenza Vaccine Indication: Patient Refuses    I understand that a diet low in cholesterol, fat, and sodium is recommended for good health. Unless I have been given specific instructions below for another diet, I accept this instruction as my diet prescription.   Other diet: Regular diet     Special Instructions:     Stroke/CVA/TIA/Hemorrhagic Ischemia Discharge Instructions  You have had a stroke. Your risk factors have been identified as follows:  Age - Over 55  Gender - Men are at a higher risk than women  High Cholesterol and lipids  It is important that you reduce your risk factors to avoid another stroke in the future. Here are some general guidelines to follow:  Eat healthy - avoid food high in fat.  Get regular exercise.  Maintain a healthy weight.  Avoid smoking.  Avoid alcohol and illegal drug use.  Take your medications as directed.  For more information regarding risk factors, refer to pages 17-19 in your Stroke Patient Education Guide. Stroke Education Guide was given to patient and family member.    Warning signs of a stroke include (which can also be found on page 3 of your Stroke Patient Education Guide):  Sudden numbness of weakness of the face, arm or leg (especially on one side of the body).  Sudden confusion, trouble speaking or understanding.  Sudden trouble seeing in one or both eyes.  Sudden trouble walking, dizziness, loss of balance or coordination.  Sudden severe headache with no known cause.  It is  very important to get treatment quickly when a stroke occurs. If you experience any of the above warning signs, call 911 immediately.     Some patients who have had a stroke will be going home on a blood thinner medication called Warfarin (Coumadin).  This medication requires very close monitoring and follow up.  This follow up can be provided by either your Primary Care Physician or by [facility] Outpatient Anticoagulation Service.  The Outpatient Anticoagulation Service is located at the [facility] at [facility] ([facility] [place]).  If you do not know when your follow up appointment is scheduled, call [phone] to verify your appointment time. and None    -Is this patient being discharged with medication to prevent blood clots?  Yes, Aspirin   Aspirin, ASA oral tablets  What is this medicine?  ASPIRIN (AS pir in) is a pain reliever. It is used to treat mild pain and fever. This medicine is also used as directed by a doctor to prevent and to treat heart attacks, to prevent strokes and blood clots, and to treat arthritis or inflammation.  This medicine may be used for other purposes; ask your health care provider or pharmacist if you have questions.  COMMON BRAND NAME(S): Aspir-Low, Aspir-Nazia, Aspirtab, Yecenia Advanced Aspirin, Yecenia Aspirin, Yecenia Aspirin Extra Strength, Yecenia Aspirin Plus, Yecenia Extra Strength, Yecenia Extra Strength Plus, Yecenia Genuine Aspirin, Yecenia Womens Aspirin, Bufferin, Bufferin Extra Strength, Bufferin Low Dose  What should I tell my health care provider before I take this medicine?  They need to know if you have any of these conditions:  anemia  asthma  bleeding problems  child with chickenpox, the flu, or other viral infection  diabetes  gout  if you frequently drink alcohol containing drinks  kidney disease  liver disease  low level of vitamin K  lupus  smoke tobacco  stomach ulcers or other problems  an unusual or allergic reaction to  aspirin, tartrazine dye, other medicines, dyes, or preservatives  pregnant or trying to get pregnant  breast-feeding  How should I use this medicine?  Take this medicine by mouth with a glass of water. Follow the directions on the package or prescription label. You can take this medicine with or without food. If it upsets your stomach, take it with food. Do not take your medicine more often than directed.  Talk to your pediatrician regarding the use of this medicine in children. While this drug may be prescribed for children as young as 12 years of age for selected conditions, precautions do apply. Children and teenagers should not use this medicine to treat chicken pox or flu symptoms unless directed by a doctor.  Patients over 65 years old may have a stronger reaction and need a smaller dose.  Overdosage: If you think you have taken too much of this medicine contact a poison control center or emergency room at once.  NOTE: This medicine is only for you. Do not share this medicine with others.  What if I miss a dose?  If you are taking this medicine on a regular schedule and miss a dose, take it as soon as you can. If it is almost time for your next dose, take only that dose. Do not take double or extra doses.  What may interact with this medicine?  Do not take this medicine with any of the following medications:  cidofovir  ketorolac  probenecid  This medicine may also interact with the following medications:  alcohol  alendronate  bismuth subsalicylate  flavocoxid  herbal supplements like feverfew, garlic, connor, ginkgo biloba, horse chestnut  medicines for diabetes or glaucoma like acetazolamide, methazolamide  medicines for gout  medicines that treat or prevent blood clots like enoxaparin, heparin, ticlopidine, warfarin  other aspirin and aspirin-like medicines  NSAIDs, medicines for pain and inflammation, like ibuprofen or naproxen  pemetrexed  sulfinpyrazone  varicella live vaccine  This list may not  describe all possible interactions. Give your health care provider a list of all the medicines, herbs, non-prescription drugs, or dietary supplements you use. Also tell them if you smoke, drink alcohol, or use illegal drugs. Some items may interact with your medicine.  What should I watch for while using this medicine?  If you are treating yourself for pain, tell your doctor or health care professional if the pain lasts more than 10 days, if it gets worse, or if there is a new or different kind of pain. Tell your doctor if you see redness or swelling. Also, check with your doctor if you have a fever that lasts for more than 3 days. Only take this medicine to prevent heart attacks or blood clotting if prescribed by your doctor or health care professional.  Do not take aspirin or aspirin-like medicines with this medicine. Too much aspirin can be dangerous. Always read the labels carefully.  This medicine can irritate your stomach or cause bleeding problems. Do not smoke cigarettes or drink alcohol while taking this medicine. Do not lie down for 30 minutes after taking this medicine to prevent irritation to your throat.  If you are scheduled for any medical or dental procedure, tell your healthcare provider that you are taking this medicine. You may need to stop taking this medicine before the procedure.  This medicine may be used to treat migraines. If you take migraine medicines for 10 or more days a month, your migraines may get worse. Keep a diary of headache days and medicine use. Contact your healthcare professional if your migraine attacks occur more frequently.  What side effects may I notice from receiving this medicine?  Side effects that you should report to your doctor or health care professional as soon as possible:  allergic reactions like skin rash, itching or hives, swelling of the face, lips, or tongue  breathing problems  changes in hearing, ringing in the ears  confusion  general ill feeling or  flu-like symptoms  pain on swallowing  redness, blistering, peeling or loosening of the skin, including inside the mouth or nose  signs and symptoms of bleeding such as bloody or black, tarry stools; red or dark-brown urine; spitting up blood or brown material that looks like coffee grounds; red spots on the skin; unusual bruising or bleeding from the eye, gums, or nose  trouble passing urine or change in the amount of urine  unusually weak or tired  yellowing of the eyes or skin  Side effects that usually do not require medical attention (report to your doctor or health care professional if they continue or are bothersome):  diarrhea or constipation  headache  nausea, vomiting  stomach gas, heartburn  This list may not describe all possible side effects. Call your doctor for medical advice about side effects. You may report side effects to FDA at 0-969-FDA-1373.  Where should I keep my medicine?  Keep out of the reach of children.  Store at room temperature between 15 and 30 degrees C (59 and 86 degrees F). Protect from heat and moisture. Do not use this medicine if it has a strong vinegar smell. Throw away any unused medicine after the expiration date.  NOTE: This sheet is a summary. It may not cover all possible information. If you have questions about this medicine, talk to your doctor, pharmacist, or health care provider.  © 2020 Elsevier/Gold Standard (2018-01-30 10:42:13)    Is patient discharged on Warfarin / Coumadin?   No     Aspirin, ASA oral tablets  What is this medicine?  ASPIRIN (AS pir in) is a pain reliever. It is used to treat mild pain and fever. This medicine is also used as directed by a doctor to prevent and to treat heart attacks, to prevent strokes and blood clots, and to treat arthritis or inflammation.  This medicine may be used for other purposes; ask your health care provider or pharmacist if you have questions.  COMMON BRAND NAME(S): Aspir-Low, Aspir-Nazia, Aspirtab, Yecenia Advanced  Aspirin, Yecenia Aspirin, Yecenia Aspirin Extra Strength, Yecenia Aspirin Plus, Yecenia Extra Strength, Yecenia Extra Strength Plus, Yecenia Genuine Aspirin, Yecenia Womens Aspirin, Bufferin, Bufferin Extra Strength, Bufferin Low Dose  What should I tell my health care provider before I take this medicine?  They need to know if you have any of these conditions:  anemia  asthma  bleeding problems  child with chickenpox, the flu, or other viral infection  diabetes  gout  if you frequently drink alcohol containing drinks  kidney disease  liver disease  low level of vitamin K  lupus  smoke tobacco  stomach ulcers or other problems  an unusual or allergic reaction to aspirin, tartrazine dye, other medicines, dyes, or preservatives  pregnant or trying to get pregnant  breast-feeding  How should I use this medicine?  Take this medicine by mouth with a glass of water. Follow the directions on the package or prescription label. You can take this medicine with or without food. If it upsets your stomach, take it with food. Do not take your medicine more often than directed.  Talk to your pediatrician regarding the use of this medicine in children. While this drug may be prescribed for children as young as 12 years of age for selected conditions, precautions do apply. Children and teenagers should not use this medicine to treat chicken pox or flu symptoms unless directed by a doctor.  Patients over 65 years old may have a stronger reaction and need a smaller dose.  Overdosage: If you think you have taken too much of this medicine contact a poison control center or emergency room at once.  NOTE: This medicine is only for you. Do not share this medicine with others.  What if I miss a dose?  If you are taking this medicine on a regular schedule and miss a dose, take it as soon as you can. If it is almost time for your next dose, take only that dose. Do not take double or extra doses.  What may interact with this medicine?  Do not take this  medicine with any of the following medications:  cidofovir  ketorolac  probenecid  This medicine may also interact with the following medications:  alcohol  alendronate  bismuth subsalicylate  flavocoxid  herbal supplements like feverfew, garlic, connor, ginkgo biloba, horse chestnut  medicines for diabetes or glaucoma like acetazolamide, methazolamide  medicines for gout  medicines that treat or prevent blood clots like enoxaparin, heparin, ticlopidine, warfarin  other aspirin and aspirin-like medicines  NSAIDs, medicines for pain and inflammation, like ibuprofen or naproxen  pemetrexed  sulfinpyrazone  varicella live vaccine  This list may not describe all possible interactions. Give your health care provider a list of all the medicines, herbs, non-prescription drugs, or dietary supplements you use. Also tell them if you smoke, drink alcohol, or use illegal drugs. Some items may interact with your medicine.  What should I watch for while using this medicine?  If you are treating yourself for pain, tell your doctor or health care professional if the pain lasts more than 10 days, if it gets worse, or if there is a new or different kind of pain. Tell your doctor if you see redness or swelling. Also, check with your doctor if you have a fever that lasts for more than 3 days. Only take this medicine to prevent heart attacks or blood clotting if prescribed by your doctor or health care professional.  Do not take aspirin or aspirin-like medicines with this medicine. Too much aspirin can be dangerous. Always read the labels carefully.  This medicine can irritate your stomach or cause bleeding problems. Do not smoke cigarettes or drink alcohol while taking this medicine. Do not lie down for 30 minutes after taking this medicine to prevent irritation to your throat.  If you are scheduled for any medical or dental procedure, tell your healthcare provider that you are taking this medicine. You may need to stop taking this  medicine before the procedure.  This medicine may be used to treat migraines. If you take migraine medicines for 10 or more days a month, your migraines may get worse. Keep a diary of headache days and medicine use. Contact your healthcare professional if your migraine attacks occur more frequently.  What side effects may I notice from receiving this medicine?  Side effects that you should report to your doctor or health care professional as soon as possible:  allergic reactions like skin rash, itching or hives, swelling of the face, lips, or tongue  breathing problems  changes in hearing, ringing in the ears  confusion  general ill feeling or flu-like symptoms  pain on swallowing  redness, blistering, peeling or loosening of the skin, including inside the mouth or nose  signs and symptoms of bleeding such as bloody or black, tarry stools; red or dark-brown urine; spitting up blood or brown material that looks like coffee grounds; red spots on the skin; unusual bruising or bleeding from the eye, gums, or nose  trouble passing urine or change in the amount of urine  unusually weak or tired  yellowing of the eyes or skin  Side effects that usually do not require medical attention (report to your doctor or health care professional if they continue or are bothersome):  diarrhea or constipation  headache  nausea, vomiting  stomach gas, heartburn  This list may not describe all possible side effects. Call your doctor for medical advice about side effects. You may report side effects to FDA at 5-106-FDA-0812.  Where should I keep my medicine?  Keep out of the reach of children.  Store at room temperature between 15 and 30 degrees C (59 and 86 degrees F). Protect from heat and moisture. Do not use this medicine if it has a strong vinegar smell. Throw away any unused medicine after the expiration date.  NOTE: This sheet is a summary. It may not cover all possible information. If you have questions about this medicine, talk  to your doctor, pharmacist, or health care provider.  © 2020 Elsevier/Gold Standard (2018-01-30 10:42:13)    Is patient discharged on Warfarin / Coumadin?   No

## 2023-03-05 NOTE — CARE PLAN
The patient is Stable - Low risk of patient condition declining or worsening    Shift Goals  Clinical Goals: Safety/mobility  Patient Goals: mri,sleep  Family Goals: mri    Progress made toward(s) clinical / shift goals:    Problem: Mobility - Stroke  Goal: Patient's capacity to carry out activities will improve  Outcome: Progressing   Pt up to chair for breakfast. Encourage pt to sit up in chair for lunch and dinner.     Patient is not progressing towards the following goals: N/A

## 2023-03-05 NOTE — PROGRESS NOTES
"Neurology Progress Note  Neurohospitalist Service, [facility]    Referring Physician: Rai Mcclendon M.D.      Interval History: No acute events overnight.  MRI brain this morning revealing acute L thalamic stroke.  SBPs overnight in 110s-120s.    Review of systems: In addition to what is detailed in the HPI and/or updated in the interval history, all other systems reviewed and are negative.    Past Medical History, Past Surgical History and Social History reviewed and unchanged from prior    Medications:    Current Facility-Administered Medications:     senna-docusate (PERICOLACE or SENOKOT S) 8.6-50 MG per tablet 2 Tablet, 2 Tablet, Oral, BID, 2 Tablet at 03/05/23 0504 **AND** polyethylene glycol/lytes (MIRALAX) PACKET 1 Packet, 1 Packet, Oral, QDAY PRN **AND** magnesium hydroxide (MILK OF MAGNESIA) suspension 30 mL, 30 mL, Oral, QDAY PRN **AND** bisacodyl (DULCOLAX) suppository 10 mg, 10 mg, Rectal, QDAY PRN, Cong Bazan M.D.    enoxaparin (Lovenox) inj 40 mg, 40 mg, Subcutaneous, DAILY AT 1800, Cong Bazan M.D., 40 mg at 03/04/23 1723    atorvastatin (LIPITOR) tablet 80 mg, 80 mg, Oral, Q EVENING, Cong Bazan M.D., 80 mg at 03/04/23 1724    aspirin EC (ECOTRIN) tablet 81 mg, 81 mg, Oral, DAILY, Cong Bazan M.D., 81 mg at 03/05/23 0504    Physical Examination:   /72   Pulse 70   Temp 36.6 °C (97.9 °F) (Temporal)   Resp 17   Ht 1.727 m (5' 8\")   Wt 79.4 kg (175 lb)   SpO2 95%   BMI 26.61 kg/m²       General: Patient is awake and in no acute distress  Neck: There is normal range of motion  CV: Regular rate   Extremities:  Warm, dry, and intact, without peripheral lower extremity edema    NEUROLOGICAL EXAM:     Mental status: Awake, alert and fully oriented, follows commands  Speech and language: Speech is clear and fluent. The patient is able to name and repeat.  Cranial nerve exam:  Visual fields are full. There is no nystagmus. Extraocular muscles " are intact. Face is symmetric.   Motor exam: There is sustained antigravity with no downward drift in bilateral arms and legs.  There is no pronator drift .  Tone is normal. No abnormal movements were seen on exam.  Sensory exam:  Reacts to tactile in all 4 extremities, there is no neglect to double stim.  Subjective numbness in R hemibody, about 50% reduction.  Coordination: No ataxia on bilateral FTN testing      NIHSS: National Institutes of Health Stroke Scale    [0] 1a:Level of Consciousness    0-alert 1-drowsy   2-stupor   3-coma  [0] 1b:LOC Questions                  0-both  1-one      2-neither  [0] 1c:LOC Commands                   0-both  1-one      2-neither  [0] 2: Best Gaze                     0-nl    1-partial  2-forced  [0] 3: Visual Fields                   0-nl    1-partial  2-complete 3-bilat  [0] 4: Facial Paresis                0-nl    1-minor    2-partial  3-full  MOTOR                       0-nl  [0] 5: Right Arm           1-drift  [0] 6: Left Arm             2-some effort vs gravity  [0] 7: Right Leg           3-no effort vs gravity  [0] 8: Left Leg             4-no movement                             x-untestable  [0] 9: Limb Ataxia                    0-abs   1-1_limb   2-2+_limbs       x-untestable  [1] 10:Sensory                        0-nl    1-partial  2-dense  [0] 11:Best Language/Aphasia         0-nl    1-mild/mod 2-severe   3-mute  [0] 12:Dysarthria                     0-nl    1-mild/mod 2-severe       x-untestable  [0] 13:Neglect/Inattention            0-none  1-partial  2-complete  [1] TOTAL      Objective Data:    Labs:  Lab Results   Component Value Date/Time    PROTHROMBTM 12.3 03/04/2023 10:35 AM    INR 0.92 03/04/2023 10:35 AM      Lab Results   Component Value Date/Time    WBC 6.3 03/05/2023 03:15 AM    RBC 5.31 03/05/2023 03:15 AM    HEMOGLOBIN 15.1 03/05/2023 03:15 AM    HEMATOCRIT 44.6 03/05/2023 03:15 AM    MCV 84.0 03/05/2023 03:15 AM    MCH 28.4 03/05/2023 03:15 AM     MCHC 33.9 03/05/2023 03:15 AM    MPV 10.9 03/05/2023 03:15 AM    NEUTSPOLYS 59.10 03/05/2023 03:15 AM    LYMPHOCYTES 30.10 03/05/2023 03:15 AM    MONOCYTES 8.60 03/05/2023 03:15 AM    EOSINOPHILS 1.40 03/05/2023 03:15 AM    BASOPHILS 0.50 03/05/2023 03:15 AM      Lab Results   Component Value Date/Time    SODIUM 140 03/05/2023 03:15 AM    POTASSIUM 4.1 03/05/2023 03:15 AM    CHLORIDE 105 03/05/2023 03:15 AM    CO2 25 03/05/2023 03:15 AM    GLUCOSE 109 (H) 03/05/2023 03:15 AM    BUN 15 03/05/2023 03:15 AM    CREATININE 0.92 03/05/2023 03:15 AM      Lab Results   Component Value Date/Time    CHOLSTRLTOT 197 03/05/2023 03:15 AM     (H) 03/05/2023 03:15 AM    HDL 52 03/05/2023 03:15 AM    TRIGLYCERIDE 74 03/05/2023 03:15 AM       Lab Results   Component Value Date/Time    ALKPHOSPHAT 79 03/04/2023 11:24 AM    ASTSGOT 27 03/04/2023 11:24 AM    ALTSGPT 49 03/04/2023 11:24 AM    TBILIRUBIN 0.3 03/04/2023 11:24 AM        Imaging/Testing:    I interpreted and/or reviewed the patient's neuroimaging    DX-CHEST-PORTABLE (1 VIEW)   Final Result         1. No acute cardiopulmonary abnormalities are identified.      CT-CEREBRAL PERFUSION ANALYSIS   Final Result      1.  Cerebral blood flow less than 30% likely representing completed infarct = 0 mL.      2.  T Max more than 6 seconds likely representing combination of completed infarct and ischemia = 0 mL.      3.  Mismatched volume likely representing ischemic brain/penumbra = None      4.  Please note that the cerebral perfusion was performed on the limited brain tissue around the basal ganglia region. Infarct/ischemia outside the CT perfusion sections can be missed in this study.      CT-CTA NECK WITH & W/O-POST PROCESSING   Final Result      1. No evidence of flow-limiting stenosis in the cervical carotid or cervical vertebral arteries.      CT-CTA HEAD WITH & W/O-POST PROCESS   Final Result         1. No hemodynamically significant narrowing of the major intracranial  vessels.      CT-HEAD W/O   Final Result         1. No acute intracranial abnormality. No evidence of acute intracranial hemorrhage or mass lesion.                     MR-BRAIN-W/O    (Results Pending)   EC-ECHOCARDIOGRAM COMPLETE W/O CONT    (Results Pending)       Assessment and Plan:  Ross Brown is a 67 year old man with hyperlipidemia, presenting with acute onset R side numbness, subsequently found to have an acute L thalamic stroke on MRI.  Stroke etiology is invariably lacunar (small vessel).  Mainstay secondary stroke prevention regimen is ASA, high intensity statin, BP and blood glucose control.  May defer TTE and long-term cardiac monitoring as stroke is not potentially cardioembolic in etiology.    Problem list:   L thalamic stroke  Hyperlipidemia    Plan:   - q4h neurochecks/NIHSS while admitted   - continue ASA 81mg daily- this is lifelong therapy   - long-term BP goal is 110-130/60-80, currently at goal without interventions   - , continue atorvastatin 80mg daily for goal LDL < 70   - HgbA1c 5.3, at goal < 7   - no long-term PT/OT needs   - may defer TTE and long-term cardiac monitoring as stroke etiology is not potentially cardioembolic   - patient to follow up with local PCP and Stroke Neurology    The evaluation of the patient, and recommended management, was discussed with the attending hospitalist. I have performed a physical exam and reviewed and updated ROS and Plan today (3/5/2023).     Gonzales Gallegos MD  Neurohospitalist, Acute Care Services

## 2023-03-05 NOTE — DISCHARGE SUMMARY
Discharge Summary    CHIEF COMPLAINT ON ADMISSION  Chief Complaint   Patient presents with    Numbness     Right sided onset 0900       Reason for Admission  EMS     Admission Date  3/4/2023    CODE STATUS  Full Code    HPI & HOSPITAL COURSE  This is a 67 y.o. male here with numbness. Patient with history of prostate cancer in remission, hypertension, dyslipidemia, who presents for numbness of right side and face, as well as discoordination of right sided extremities. Patient evaluated per stroke protocol, did not receive TPA. CT head and CTA head, neck, perfusion done showing normal findings. MRI brain shows small left lacunar stroke per neurology read. Patient started on aspirin and statin therapy. Echocardiogram and cardiac monitoring not needed due to location of lacunar stroke. Patient feeling well, he is discharged to home and is to follow up with his PCP.    Therefore, he is discharged in fair and stable condition to home with close outpatient follow-up.    The patient recovered much more quickly than anticipated on admission.    Discharge Date  3/5/2023    FOLLOW UP ITEMS POST DISCHARGE  Take medications as prescribed.  Follow up with PCP in [facility].    DISCHARGE DIAGNOSES  Principal Problem:    Stroke with cerebral ischemia (HCC) POA: Yes  Active Problems:    Primary hypertension POA: Unknown    Prostate cancer (HCC) POA: Unknown  Resolved Problems:    * No resolved hospital problems. *      FOLLOW UP  No future appointments.  No follow-up provider specified.    MEDICATIONS ON DISCHARGE     Medication List        START taking these medications        Instructions   Aspirin Low Dose 81 MG EC tablet  Start taking on: March 6, 2023  Generic drug: aspirin   Take 1 Tablet by mouth every day.  Dose: 81 mg     atorvastatin 80 MG tablet  Commonly known as: LIPITOR   Take 1 Tablet by mouth every evening.  Dose: 80 mg            CONTINUE taking these medications        Instructions   colestipol 1 GM Tabs  Commonly  known as: COLESTID   Take 1 g by mouth 2 times a day.  Dose: 1 g     omeprazole 20 MG delayed-release capsule  Commonly known as: PRILOSEC   Take 20 mg by mouth every day.  Dose: 20 mg     sildenafil 20 MG tablet  Commonly known as: REVATIO   Take 20 mg by mouth every day.  Dose: 20 mg              Allergies  Allergies   Allergen Reactions    Yellow Jacket Venom Anaphylaxis    Penicillins Swelling     Swelling 25 years ago (1998)       DIET  Orders Placed This Encounter   Procedures    Diet Order Diet: Regular     Standing Status:   Standing     Number of Occurrences:   1     Order Specific Question:   Diet:     Answer:   Regular [1]       ACTIVITY  As tolerated.  Weight bearing as tolerated    CONSULTATIONS  neurology    PROCEDURES  none    LABORATORY  Lab Results   Component Value Date    SODIUM 140 03/05/2023    POTASSIUM 4.1 03/05/2023    CHLORIDE 105 03/05/2023    CO2 25 03/05/2023    GLUCOSE 109 (H) 03/05/2023    BUN 15 03/05/2023    CREATININE 0.92 03/05/2023        Lab Results   Component Value Date    WBC 6.3 03/05/2023    HEMOGLOBIN 15.1 03/05/2023    HEMATOCRIT 44.6 03/05/2023    PLATELETCT 181 03/05/2023        Total time of the discharge process exceeds 34 minutes.